# Patient Record
Sex: MALE | Race: BLACK OR AFRICAN AMERICAN | HISPANIC OR LATINO | URBAN - METROPOLITAN AREA
[De-identification: names, ages, dates, MRNs, and addresses within clinical notes are randomized per-mention and may not be internally consistent; named-entity substitution may affect disease eponyms.]

---

## 2021-04-08 DIAGNOSIS — Z23 ENCOUNTER FOR IMMUNIZATION: ICD-10-CM

## 2021-08-30 ENCOUNTER — OFFICE VISIT (OUTPATIENT)
Dept: FAMILY MEDICINE CLINIC | Facility: CLINIC | Age: 36
End: 2021-08-30

## 2021-08-30 VITALS
HEIGHT: 67 IN | WEIGHT: 142 LBS | SYSTOLIC BLOOD PRESSURE: 112 MMHG | HEART RATE: 64 BPM | DIASTOLIC BLOOD PRESSURE: 60 MMHG | RESPIRATION RATE: 16 BRPM | OXYGEN SATURATION: 98 % | TEMPERATURE: 98 F | BODY MASS INDEX: 22.29 KG/M2

## 2021-08-30 DIAGNOSIS — G56.31 NEUROPATHY OF RIGHT RADIAL NERVE: Primary | ICD-10-CM

## 2021-08-30 PROCEDURE — 3008F BODY MASS INDEX DOCD: CPT | Performed by: FAMILY MEDICINE

## 2021-08-30 PROCEDURE — 99213 OFFICE O/P EST LOW 20 MIN: CPT | Performed by: FAMILY MEDICINE

## 2021-08-30 PROCEDURE — 3725F SCREEN DEPRESSION PERFORMED: CPT | Performed by: FAMILY MEDICINE

## 2021-08-30 NOTE — PROGRESS NOTES
Assessment/Plan:    Neuropathy of right radial nerve  Multifactorial, likely due to prolonged external compression during sleep, increased pressure due to increased sarah lifting or repetitive trauma from playing gold  Conservative measures like elbow support  Avoid exercise that involves that joint for at 15 days  Prescribed OTC NSAIDs, and multivitamins  Reassess as needed, recommended to call the office if worsening symptoms        Diagnoses and all orders for this visit:    Neuropathy of right radial nerve          Subjective:      Patient ID: Yanique Harris is a 39 y o  male  [address] a 39 y o  M with a PMHx relevant for asthma who present for a same day visit and to establish health care  Patient complains of right arm numbness after waking up one morning, started 2 wks ago, associated with tingling and pain 5/10 that radiates to the dorsal aspect of his hand, and to the distal portion of his 2nd and 3rd phalanx  Patient is an avid golfer, also report recentlyincreased in weight lifting, denies trauma, headaches, dizziness, blurred vision, nausea,vomits, palpitation, chest pain, SOB, urinary changes, weakness, bowel changes, sleep problems,  sick contacts or recent travel  Patient's medical conditions are stable unless noted otherwise above   Patient has not had any recent hospitalizations, or medical emergencies  Overall patient reports feeling well  Patient has no further complaints other than what is mentioned in the ROS  Denies tobacco, alcohol and illicit drug consumption, he is currently employed, lives with wife and child  Arm Pain   Incident onset: 2 weeks ago  Incident location: non specific  There was no injury mechanism  Quality: pressure like  The pain is at a severity of 5/10  The pain has been constant since the incident  Associated symptoms include numbness and tingling  Pertinent negatives include no chest pain or muscle weakness   Exacerbated by: REASON FOR VISIT:    Bethany Oconnor is a 60year old male who presents for an [address]     60 y.o M here for physical exam; on simvastatin 40 mg po qHS;  no CP; no SOB; no headaches; no palpitation; no constipation; no diarrhea; no melena; Every 5 years No results found for this or any previous visit. Fecal Occult Blood  Annually No results found for: FOB, OCCULTSTOOL    Obesity Screening Screen all adults annually Body mass index is 23.06 kg/m².       Preventive Services for Which Recomme centains positions like sleeping on the arm        The following portions of the patient's history were reviewed and updated as appropriate: allergies, current medications, past family history, past medical history, past social history, past surgical history and problem list     Review of Systems   Constitutional: Negative for activity change, chills, diaphoresis, fatigue, fever and unexpected weight change  HENT: Negative for ear discharge, rhinorrhea, sinus pressure and sinus pain  Eyes: Negative for discharge, redness, itching and visual disturbance  Respiratory: Negative for cough, chest tightness, shortness of breath and wheezing  Cardiovascular: Negative for chest pain, palpitations and leg swelling  Gastrointestinal: Negative for abdominal distention, abdominal pain, anal bleeding, blood in stool, constipation, diarrhea, nausea, rectal pain and vomiting  Genitourinary: Negative for difficulty urinating, dysuria and flank pain  Musculoskeletal: Negative for arthralgias, neck pain and neck stiffness  Skin: Negative for color change, pallor, rash and wound  Neurological: Positive for tingling and numbness  Negative for dizziness, tremors, seizures, facial asymmetry, speech difficulty, weakness, light-headedness and headaches  Psychiatric/Behavioral: Negative for sleep disturbance and suicidal ideas  Objective:      /60 (BP Location: Right arm, Patient Position: Sitting, Cuff Size: Standard)   Pulse 64   Temp 98 °F (36 7 °C) (Temporal)   Resp 16   Ht 5' 7" (1 702 m)   Wt 64 4 kg (142 lb)   SpO2 98%   BMI 22 24 kg/m²          Physical Exam  Vitals and nursing note reviewed  Constitutional:       General: He is not in acute distress  Appearance: He is well-developed  He is not ill-appearing, toxic-appearing or diaphoretic  HENT:      Head: Normocephalic and atraumatic  Eyes:      General: No scleral icterus       Extraocular Movements: Extraocular movements intact  Cardiovascular:      Rate and Rhythm: Normal rate and regular rhythm  No extrasystoles are present  Pulses:           Radial pulses are 2+ on the right side and 2+ on the left side  Heart sounds: Normal heart sounds, S1 normal and S2 normal  No murmur heard  No friction rub  No gallop  Pulmonary:      Effort: Pulmonary effort is normal  No respiratory distress  Breath sounds: Normal breath sounds and air entry  Abdominal:      General: Bowel sounds are normal       Palpations: Abdomen is soft  There is no mass  Tenderness: There is no abdominal tenderness  There is no right CVA tenderness, left CVA tenderness, guarding or rebound  Musculoskeletal:         General: No swelling, deformity or signs of injury  Normal range of motion  Right elbow: Tenderness present in lateral epicondyle  Left elbow: Normal       Cervical back: Normal range of motion  Right lower leg: No edema  Left lower leg: No edema  Feet:      Right foot:      Skin integrity: No ulcer, skin breakdown, erythema, warmth, callus or dry skin  Left foot:      Skin integrity: No ulcer, skin breakdown, erythema, warmth, callus or dry skin  Skin:     General: Skin is warm  Findings: No abrasion, abscess, acne, bruising, burn, ecchymosis, erythema, signs of injury, laceration, lesion, petechiae, rash or wound  Rash is not crusting, macular, nodular, papular, purpuric, pustular, scaling, urticarial or vesicular  Nails: There is no clubbing  Neurological:      General: No focal deficit present  Mental Status: He is alert and oriented to person, place, and time  Cranial Nerves: Cranial nerves are intact  Sensory: Sensory deficit present  Motor: Motor function is intact  Coordination: Coordination is intact  Gait: Gait is intact  Deep Tendon Reflexes: Reflexes are normal and symmetric        Reflex Scores:       Tricep reflexes are 2+ on the left or double vision  HEENT: denies nasal congestion, sinus pain or ST  LUNGS: denies shortness of breath with exertion  CARDIOVASCULAR: denies chest pain on exertion  GI: denies abdominal pain, denies heartburn  : denies nocturia or changes in stream  MUSCU HORMONE; Future    Screening PSA (prostate specific antigen)  -     PSA SCREEN; Future    Other orders  -     simvastatin 40 MG Oral Tab; Take 1 tablet (40 mg total) by mouth nightly.            Physical exam  Colonoscopy in 2011 ;next due in 2021 per pt; b side      Comments: Decreased sensation with a radial nerve distribution scottie

## 2021-09-01 PROBLEM — G56.31 NEUROPATHY OF RIGHT RADIAL NERVE: Status: ACTIVE | Noted: 2021-09-01

## 2021-09-01 NOTE — ASSESSMENT & PLAN NOTE
Multifactorial, likely due to prolonged external compression during sleep, increased pressure due to increased sarah lifting or repetitive trauma from playing gold  Conservative measures like elbow support  Avoid exercise that involves that joint for at 15 days  Prescribed OTC NSAIDs, and multivitamins    Reassess as needed, recommended to call the office if worsening symptoms

## 2021-10-19 ENCOUNTER — OFFICE VISIT (OUTPATIENT)
Dept: FAMILY MEDICINE CLINIC | Facility: CLINIC | Age: 36
End: 2021-10-19

## 2021-10-19 VITALS
TEMPERATURE: 97.1 F | RESPIRATION RATE: 16 BRPM | BODY MASS INDEX: 24.48 KG/M2 | HEART RATE: 83 BPM | SYSTOLIC BLOOD PRESSURE: 110 MMHG | HEIGHT: 67 IN | OXYGEN SATURATION: 97 % | WEIGHT: 156 LBS | DIASTOLIC BLOOD PRESSURE: 60 MMHG

## 2021-10-19 DIAGNOSIS — K29.00 ACUTE GASTRITIS WITHOUT HEMORRHAGE, UNSPECIFIED GASTRITIS TYPE: Primary | ICD-10-CM

## 2021-10-19 PROBLEM — K29.70 GASTRITIS: Status: ACTIVE | Noted: 2021-10-19

## 2021-10-19 PROCEDURE — 3725F SCREEN DEPRESSION PERFORMED: CPT | Performed by: INTERNAL MEDICINE

## 2021-10-19 PROCEDURE — 99213 OFFICE O/P EST LOW 20 MIN: CPT | Performed by: INTERNAL MEDICINE

## 2021-10-19 PROCEDURE — 3008F BODY MASS INDEX DOCD: CPT | Performed by: INTERNAL MEDICINE
